# Patient Record
Sex: FEMALE | Race: WHITE | NOT HISPANIC OR LATINO | Employment: FULL TIME | URBAN - METROPOLITAN AREA
[De-identification: names, ages, dates, MRNs, and addresses within clinical notes are randomized per-mention and may not be internally consistent; named-entity substitution may affect disease eponyms.]

---

## 2022-05-18 ENCOUNTER — OFFICE VISIT (OUTPATIENT)
Dept: PLASTIC SURGERY | Facility: CLINIC | Age: 52
End: 2022-05-18
Payer: COMMERCIAL

## 2022-05-18 VITALS — HEART RATE: 86 BPM | DIASTOLIC BLOOD PRESSURE: 83 MMHG | SYSTOLIC BLOOD PRESSURE: 148 MMHG

## 2022-05-18 DIAGNOSIS — Z98.890 S/P BREAST RECONSTRUCTION: Primary | ICD-10-CM

## 2022-05-18 DIAGNOSIS — T85.49XA DEFLATION OF BREAST IMPLANT, INITIAL ENCOUNTER: ICD-10-CM

## 2022-05-18 PROCEDURE — 99203 PR OFFICE/OUTPT VISIT, NEW, LEVL III, 30-44 MIN: ICD-10-PCS | Mod: S$GLB,,, | Performed by: SURGERY

## 2022-05-18 PROCEDURE — 3079F DIAST BP 80-89 MM HG: CPT | Mod: CPTII,S$GLB,, | Performed by: SURGERY

## 2022-05-18 PROCEDURE — 99999 PR PBB SHADOW E&M-NEW PATIENT-LVL II: ICD-10-PCS | Mod: PBBFAC,,, | Performed by: SURGERY

## 2022-05-18 PROCEDURE — 3077F SYST BP >= 140 MM HG: CPT | Mod: CPTII,S$GLB,, | Performed by: SURGERY

## 2022-05-18 PROCEDURE — 3079F PR MOST RECENT DIASTOLIC BLOOD PRESSURE 80-89 MM HG: ICD-10-PCS | Mod: CPTII,S$GLB,, | Performed by: SURGERY

## 2022-05-18 PROCEDURE — 3077F PR MOST RECENT SYSTOLIC BLOOD PRESSURE >= 140 MM HG: ICD-10-PCS | Mod: CPTII,S$GLB,, | Performed by: SURGERY

## 2022-05-18 PROCEDURE — 99999 PR PBB SHADOW E&M-NEW PATIENT-LVL II: CPT | Mod: PBBFAC,,, | Performed by: SURGERY

## 2022-05-18 PROCEDURE — 99203 OFFICE O/P NEW LOW 30 MIN: CPT | Mod: S$GLB,,, | Performed by: SURGERY

## 2022-05-18 NOTE — PROGRESS NOTES
Patient presents Plastic surgery Clinic after undergoing a bilateral mastectomy and reconstruction using tissue expanders followed by implants in 2018. Patient now presents to the Plastic surgery Clinic with a deflated left breast implant.  The  and patient basically stated happen very rapidly.  Physical examination reveals very good was reconstruction on the right side.  She could use a little bit of liposuction in the axillary region.  On the left side the implant was totally deflated.  We will go ahead and bring the patient to surgery for an implant exchange.  The patient does want to be more perky and I had a long discussion with the patient the only way to do this we make her implants larger. She has agreed to this but not much larger.

## 2022-05-25 ENCOUNTER — TELEPHONE (OUTPATIENT)
Dept: PLASTIC SURGERY | Facility: CLINIC | Age: 52
End: 2022-05-25
Payer: COMMERCIAL

## 2022-05-25 NOTE — TELEPHONE ENCOUNTER
[facility] [phone]     ----- Message from Kenia Hollis sent at 5/25/2022 10:13 AM CDT -----  Regarding: Schedule surgery  Contact: [phone]  Calling to speak with nurse to schedule surgery and initiate authorization for procedure. Please call and discuss.      
 Infant (Birth)

## 2022-05-27 ENCOUNTER — PATIENT MESSAGE (OUTPATIENT)
Dept: PLASTIC SURGERY | Facility: CLINIC | Age: 52
End: 2022-05-27
Payer: COMMERCIAL

## 2022-05-27 ENCOUNTER — TELEPHONE (OUTPATIENT)
Dept: PLASTIC SURGERY | Facility: CLINIC | Age: 52
End: 2022-05-27
Payer: COMMERCIAL

## 2022-05-27 DIAGNOSIS — Z85.3 HISTORY OF BREAST CANCER IN FEMALE: Primary | ICD-10-CM

## 2022-05-27 DIAGNOSIS — Z01.818 PRE-OP TESTING: Primary | ICD-10-CM

## 2022-05-27 NOTE — TELEPHONE ENCOUNTER
Contacted pt with arrival time for surgery on Monday, May 30.  Pt advised to arrive on the 2nd floor, [facility] for 9:30a.  Pt also reminded not to eat or drink after midnight.  Pt's questions addressed.  Pt verbalized understanding.

## 2022-05-29 ENCOUNTER — ANESTHESIA EVENT (OUTPATIENT)
Dept: SURGERY | Facility: HOSPITAL | Age: 52
End: 2022-05-29
Payer: COMMERCIAL

## 2022-05-29 RX ORDER — MIDAZOLAM HYDROCHLORIDE 1 MG/ML
.5-4 INJECTION INTRAMUSCULAR; INTRAVENOUS
Status: CANCELLED | OUTPATIENT
Start: 2022-05-29

## 2022-05-29 RX ORDER — FENTANYL CITRATE 50 UG/ML
25-200 INJECTION, SOLUTION INTRAMUSCULAR; INTRAVENOUS
Status: CANCELLED | OUTPATIENT
Start: 2022-05-29

## 2022-05-29 NOTE — PRE-PROCEDURE INSTRUCTIONS
Preop instructions: NPO solids/ milk products after midnight and clears up to6a(clear liquids are: water, apple juice, Gatorade), shower instructions, directions, leave all valuables at home, wear comfortable clothes (something that buttons up the front is best), explained. Patient stated an understanding.     Patient denies any side effects or issues with anesthesia or sedation.

## 2022-05-30 ENCOUNTER — ANESTHESIA (OUTPATIENT)
Dept: SURGERY | Facility: HOSPITAL | Age: 52
End: 2022-05-30
Payer: COMMERCIAL

## 2022-05-30 ENCOUNTER — HOSPITAL ENCOUNTER (OUTPATIENT)
Facility: HOSPITAL | Age: 52
Discharge: HOME | End: 2022-05-30
Attending: SURGERY | Admitting: SURGERY
Payer: COMMERCIAL

## 2022-05-30 VITALS
OXYGEN SATURATION: 97 % | RESPIRATION RATE: 20 BRPM | BODY MASS INDEX: 28.32 KG/M2 | HEIGHT: 65 IN | HEART RATE: 52 BPM | WEIGHT: 170 LBS | DIASTOLIC BLOOD PRESSURE: 63 MMHG | TEMPERATURE: 98 F | SYSTOLIC BLOOD PRESSURE: 107 MMHG

## 2022-05-30 DIAGNOSIS — Z98.890 HISTORY OF BREAST RECONSTRUCTION: Primary | ICD-10-CM

## 2022-05-30 DIAGNOSIS — Z98.890 H/O BREAST RECONSTRUCTION: ICD-10-CM

## 2022-05-30 LAB
CTP QC/QA: YES
SARS-COV-2 AG RESP QL IA.RAPID: NEGATIVE

## 2022-05-30 PROCEDURE — 19342 INSJ/RPLCMT BRST IMPLT SEP D: CPT | Mod: 50,,, | Performed by: SURGERY

## 2022-05-30 PROCEDURE — D9220A PRA ANESTHESIA: Mod: ,,, | Performed by: ANESTHESIOLOGY

## 2022-05-30 PROCEDURE — 71000015 HC POSTOP RECOV 1ST HR: Performed by: SURGERY

## 2022-05-30 PROCEDURE — 63600175 PHARM REV CODE 636 W HCPCS: Performed by: ANESTHESIOLOGY

## 2022-05-30 PROCEDURE — 19342 PR DELAY BREAST PROS AFTER BREAST SURG: ICD-10-PCS | Mod: 50,,, | Performed by: SURGERY

## 2022-05-30 PROCEDURE — 25000003 PHARM REV CODE 250: Performed by: ANESTHESIOLOGY

## 2022-05-30 PROCEDURE — 71000044 HC DOSC ROUTINE RECOVERY FIRST HOUR: Performed by: SURGERY

## 2022-05-30 PROCEDURE — 19330 RMVL RUPTURED BREAST IMPLANT: CPT | Mod: 51,LT,, | Performed by: SURGERY

## 2022-05-30 PROCEDURE — 88300 SURGICAL PATH GROSS: CPT | Mod: 59 | Performed by: PATHOLOGY

## 2022-05-30 PROCEDURE — 88300 PR  SURG PATH,GROSS,LEVEL I: ICD-10-PCS | Mod: 26,,, | Performed by: PATHOLOGY

## 2022-05-30 PROCEDURE — 37000008 HC ANESTHESIA 1ST 15 MINUTES: Performed by: SURGERY

## 2022-05-30 PROCEDURE — 88300 SURGICAL PATH GROSS: CPT | Mod: 26,,, | Performed by: PATHOLOGY

## 2022-05-30 PROCEDURE — C1789 PROSTHESIS, BREAST, IMP: HCPCS | Performed by: SURGERY

## 2022-05-30 PROCEDURE — 25000003 PHARM REV CODE 250: Performed by: STUDENT IN AN ORGANIZED HEALTH CARE EDUCATION/TRAINING PROGRAM

## 2022-05-30 PROCEDURE — D9220A PRA ANESTHESIA: ICD-10-PCS | Mod: ,,, | Performed by: ANESTHESIOLOGY

## 2022-05-30 PROCEDURE — 25000003 PHARM REV CODE 250: Performed by: SURGERY

## 2022-05-30 PROCEDURE — 36000706: Performed by: SURGERY

## 2022-05-30 PROCEDURE — 37000009 HC ANESTHESIA EA ADD 15 MINS: Performed by: SURGERY

## 2022-05-30 PROCEDURE — 19330 PR REMOVAL OF IMPLANT MATERIAL: ICD-10-PCS | Mod: 51,LT,, | Performed by: SURGERY

## 2022-05-30 PROCEDURE — 36000707: Performed by: SURGERY

## 2022-05-30 PROCEDURE — 63600175 PHARM REV CODE 636 W HCPCS: Performed by: SURGERY

## 2022-05-30 PROCEDURE — 71000016 HC POSTOP RECOV ADDL HR: Performed by: SURGERY

## 2022-05-30 DEVICE — IMPLANTABLE DEVICE: Type: IMPLANTABLE DEVICE | Site: BREAST | Status: FUNCTIONAL

## 2022-05-30 RX ORDER — HALOPERIDOL 5 MG/ML
INJECTION INTRAMUSCULAR
Status: DISCONTINUED | OUTPATIENT
Start: 2022-05-30 | End: 2022-05-30

## 2022-05-30 RX ORDER — SUCCINYLCHOLINE CHLORIDE 20 MG/ML
INJECTION INTRAMUSCULAR; INTRAVENOUS
Status: DISCONTINUED | OUTPATIENT
Start: 2022-05-30 | End: 2022-05-30

## 2022-05-30 RX ORDER — KETAMINE HCL IN 0.9 % NACL 50 MG/5 ML
SYRINGE (ML) INTRAVENOUS
Status: DISCONTINUED | OUTPATIENT
Start: 2022-05-30 | End: 2022-05-30

## 2022-05-30 RX ORDER — ONDANSETRON 2 MG/ML
INJECTION INTRAMUSCULAR; INTRAVENOUS
Status: DISCONTINUED | OUTPATIENT
Start: 2022-05-30 | End: 2022-05-30

## 2022-05-30 RX ORDER — OXYCODONE AND ACETAMINOPHEN 5; 325 MG/1; MG/1
1 TABLET ORAL EVERY 6 HOURS PRN
Qty: 12 TABLET | Refills: 0 | Status: SHIPPED | OUTPATIENT
Start: 2022-05-30 | End: 2022-06-02

## 2022-05-30 RX ORDER — CIPROFLOXACIN 2 MG/ML
INJECTION, SOLUTION INTRAVENOUS
Status: COMPLETED | OUTPATIENT
Start: 2022-05-30 | End: 2022-05-30

## 2022-05-30 RX ORDER — CLINDAMYCIN HYDROCHLORIDE 300 MG/1
300 CAPSULE ORAL EVERY 6 HOURS
Qty: 28 CAPSULE | Refills: 0 | Status: SHIPPED | OUTPATIENT
Start: 2022-05-30 | End: 2022-06-06

## 2022-05-30 RX ORDER — ACETAMINOPHEN 10 MG/ML
INJECTION, SOLUTION INTRAVENOUS
Status: DISCONTINUED | OUTPATIENT
Start: 2022-05-30 | End: 2022-05-30

## 2022-05-30 RX ORDER — SODIUM CHLORIDE 0.9 % (FLUSH) 0.9 %
3 SYRINGE (ML) INJECTION
Status: DISCONTINUED | OUTPATIENT
Start: 2022-05-30 | End: 2022-05-30 | Stop reason: HOSPADM

## 2022-05-30 RX ORDER — OXYCODONE AND ACETAMINOPHEN 5; 325 MG/1; MG/1
1 TABLET ORAL ONCE
Status: COMPLETED | OUTPATIENT
Start: 2022-05-30 | End: 2022-05-30

## 2022-05-30 RX ORDER — DEXMEDETOMIDINE HYDROCHLORIDE 100 UG/ML
INJECTION, SOLUTION INTRAVENOUS
Status: DISCONTINUED | OUTPATIENT
Start: 2022-05-30 | End: 2022-05-30

## 2022-05-30 RX ORDER — ROCURONIUM BROMIDE 10 MG/ML
INJECTION, SOLUTION INTRAVENOUS
Status: DISCONTINUED | OUTPATIENT
Start: 2022-05-30 | End: 2022-05-30

## 2022-05-30 RX ORDER — MIDAZOLAM HYDROCHLORIDE 1 MG/ML
INJECTION, SOLUTION INTRAMUSCULAR; INTRAVENOUS
Status: DISCONTINUED | OUTPATIENT
Start: 2022-05-30 | End: 2022-05-30

## 2022-05-30 RX ORDER — DEXAMETHASONE SODIUM PHOSPHATE 4 MG/ML
INJECTION, SOLUTION INTRA-ARTICULAR; INTRALESIONAL; INTRAMUSCULAR; INTRAVENOUS; SOFT TISSUE
Status: DISCONTINUED | OUTPATIENT
Start: 2022-05-30 | End: 2022-05-30

## 2022-05-30 RX ORDER — CLINDAMYCIN PHOSPHATE 600 MG/50ML
600 INJECTION, SOLUTION INTRAVENOUS ONCE
Status: DISCONTINUED | OUTPATIENT
Start: 2022-05-30 | End: 2022-05-30 | Stop reason: HOSPADM

## 2022-05-30 RX ORDER — NEOSTIGMINE METHYLSULFATE 0.5 MG/ML
INJECTION, SOLUTION INTRAVENOUS
Status: DISCONTINUED | OUTPATIENT
Start: 2022-05-30 | End: 2022-05-30

## 2022-05-30 RX ORDER — PROPOFOL 10 MG/ML
VIAL (ML) INTRAVENOUS
Status: DISCONTINUED | OUTPATIENT
Start: 2022-05-30 | End: 2022-05-30

## 2022-05-30 RX ORDER — CEFAZOLIN SODIUM/WATER 2 G/20 ML
SYRINGE (ML) INTRAVENOUS
Status: DISCONTINUED | OUTPATIENT
Start: 2022-05-30 | End: 2022-05-30

## 2022-05-30 RX ADMIN — GLYCOPYRROLATE 0.4 MG: 0.2 INJECTION INTRAMUSCULAR; INTRAVENOUS at 01:05

## 2022-05-30 RX ADMIN — OXYCODONE HYDROCHLORIDE AND ACETAMINOPHEN 1 TABLET: 5; 325 TABLET ORAL at 02:05

## 2022-05-30 RX ADMIN — DEXMEDETOMIDINE HYDROCHLORIDE 0.25 MCG/KG/HR: 100 INJECTION, SOLUTION INTRAVENOUS at 12:05

## 2022-05-30 RX ADMIN — DEXAMETHASONE SODIUM PHOSPHATE 8 MG: 4 INJECTION INTRA-ARTICULAR; INTRALESIONAL; INTRAMUSCULAR; INTRAVENOUS; SOFT TISSUE at 12:05

## 2022-05-30 RX ADMIN — ROCURONIUM BROMIDE 2 MG: 10 INJECTION INTRAVENOUS at 12:05

## 2022-05-30 RX ADMIN — ACETAMINOPHEN 1000 MG: 10 INJECTION INTRAVENOUS at 12:05

## 2022-05-30 RX ADMIN — Medication 20 MG: at 12:05

## 2022-05-30 RX ADMIN — Medication 2 G: at 12:05

## 2022-05-30 RX ADMIN — PROPOFOL 180 MG: 10 INJECTION, EMULSION INTRAVENOUS at 12:05

## 2022-05-30 RX ADMIN — SODIUM CHLORIDE, SODIUM GLUCONATE, SODIUM ACETATE, POTASSIUM CHLORIDE, MAGNESIUM CHLORIDE, SODIUM PHOSPHATE, DIBASIC, AND POTASSIUM PHOSPHATE: .53; .5; .37; .037; .03; .012; .00082 INJECTION, SOLUTION INTRAVENOUS at 12:05

## 2022-05-30 RX ADMIN — DEXMEDETOMIDINE HYDROCHLORIDE 77 MCG: 100 INJECTION, SOLUTION INTRAVENOUS at 12:05

## 2022-05-30 RX ADMIN — NEOSTIGMINE METHYLSULFATE 3 MG: 0.5 INJECTION, SOLUTION INTRAVENOUS at 01:05

## 2022-05-30 RX ADMIN — ROCURONIUM BROMIDE 38 MG: 10 INJECTION INTRAVENOUS at 12:05

## 2022-05-30 RX ADMIN — MIDAZOLAM 2 MG: 1 INJECTION INTRAMUSCULAR; INTRAVENOUS at 12:05

## 2022-05-30 RX ADMIN — ONDANSETRON 4 MG: 2 INJECTION INTRAMUSCULAR; INTRAVENOUS at 01:05

## 2022-05-30 RX ADMIN — HALOPERIDOL LACTATE 1 MG: 5 INJECTION, SOLUTION INTRAMUSCULAR at 12:05

## 2022-05-30 RX ADMIN — SUCCINYLCHOLINE CHLORIDE 160 MG: 20 INJECTION, SOLUTION INTRAMUSCULAR; INTRAVENOUS; PARENTERAL at 12:05

## 2022-05-30 NOTE — ANESTHESIA POSTPROCEDURE EVALUATION
Anesthesia Post Evaluation    Patient: Tomasa Lay    Procedure(s) Performed: Procedure(s) (LRB):  REMOVAL, IMPLANT (Bilateral)  REPLACEMENT, IMPLANT, BREAST (Bilateral)    Final Anesthesia Type: general      Patient location during evaluation: PACU  Patient participation: Yes- Able to Participate  Level of consciousness: awake and alert  Post-procedure vital signs: reviewed and stable  Pain management: adequate  Airway patency: patent    PONV status at discharge: No PONV  Anesthetic complications: no      Cardiovascular status: blood pressure returned to baseline  Respiratory status: unassisted  Hydration status: euvolemic  Follow-up not needed.          Vitals Value Taken Time   /64 05/30/22 1502   Temp 36.4 °C (97.6 °F) 05/30/22 1345   Pulse 52 05/30/22 1514   Resp 12 05/30/22 1514   SpO2 97 % 05/30/22 1514   Vitals shown include unvalidated device data.      No case tracking events are documented in the log.      Pain/Tiffani Score: Pain Rating Prior to Med Admin: 8 (5/30/2022  2:22 PM)  Tiffani Score: 9 (5/30/2022  1:45 PM)

## 2022-05-30 NOTE — TRANSFER OF CARE
"Anesthesia Transfer of Care Note    Patient: Tomasa Lay    Procedure(s) Performed: Procedure(s) (LRB):  REMOVAL, IMPLANT (Bilateral)  REPLACEMENT, IMPLANT, BREAST (Bilateral)    Patient location: PACU    Anesthesia Type: general    Transport from OR: Transported from OR on 6-10 L/min O2 by face mask with adequate spontaneous ventilation    Post pain: adequate analgesia    Post assessment: no apparent anesthetic complications    Post vital signs: stable    Level of consciousness: awake    Nausea/Vomiting: no nausea/vomiting    Complications: none    Transfer of care protocol was followed      Last vitals:   Visit Vitals  BP (!) 148/69 (BP Location: Left arm, Patient Position: Lying)   Pulse 75   Temp 36.6 °C (97.9 °F) (Oral)   Resp 16   Ht 5' 5" (1.651 m)   Wt 77.1 kg (170 lb)   SpO2 100%   Breastfeeding No   BMI 28.29 kg/m²     "

## 2022-05-30 NOTE — PATIENT INSTRUCTIONS
Take pain medication and oral antibiotics as prescribed. Only surgical bra for 6 weeks. Keep surgical sites clean and dry for 48 hours and then can shower normally. Return to clinic in 1 week

## 2022-05-30 NOTE — ANESTHESIA PROCEDURE NOTES
Intubation    Date/Time: 5/30/2022 12:29 PM  Performed by: Wesley Mcdaniel MD  Authorized by: Wesley Mcdaniel MD     Intubation:     Induction:  Intravenous    Intubated:  Postinduction    Mask Ventilation:  N/a    Attempts:  1    Attempted By:  Staff anesthesiologist    Method of Intubation:  Video laryngoscopy    Blade:  Lanza 2    Laryngeal View Grade: Grade I - full view of cords      Difficult Airway Encountered?: No      Complications:  None    Airway Device:  Oral endotracheal tube    Airway Device Size:  7.5    Style/Cuff Inflation:  Cuffed    Inflation Amount (mL):  5    Tube secured:  20    Secured at:  The lips    Placement Verified By:  Capnometry    Complicating Factors:  None    Findings Post-Intubation:  BS equal bilateral

## 2022-05-30 NOTE — DISCHARGE SUMMARY
[facility] - Surgery (2nd Fl)  Discharge Note  Short Stay    Procedure(s) (LRB):  REMOVAL, IMPLANT (Bilateral)  REPLACEMENT, IMPLANT, BREAST (Bilateral)    OUTCOME: Patient tolerated treatment/procedure well without complication and is now ready for discharge.    DISPOSITION: Home or Self Care    FINAL DIAGNOSIS:  History of breast cancer    FOLLOWUP: In clinic    DISCHARGE INSTRUCTIONS:  Take pain medication and oral antibiotics as prescribed. Only surgical bra for 6 weeks. Keep surgical sites clean and dry for 48 hours and then can shower normally. Return to clinic in 1 week     TIME SPENT ON DISCHARGE: 10 minutes

## 2022-05-30 NOTE — OP NOTE
Date of surgery 05/30/2022  Preoperative diagnosis history of breast cancer  2. Deflated left breast implant  Postoperative diagnosis is the same  Procedure performed bilateral implant exchange  Surgeon Vahe  Anesthesia general  Complications none  Blood loss minimal  Drains none    Patient was placed in supine position after adequate general anesthesia was prepped and draped in a normal sterile fashion.  The previous mastectomy incisions laterally were opened.  The capsule was opened at a different level.  The deflated left breast implant was removed and the full right saline implant was removed.  The implants were 750 cc.    The chest wall was then re-prepped redraped gloves were changed chest wall was prepped with ChloraPrep to 800 cc smooth silicone implants were placed in the pockets.  Capsules were closed using interrupted 2-0 Vicryl the skin on both sides was closed using interrupted 3-0 Monocryl followed by running 4-0 Monocryl subcuticular suture.  There were no complications end dictation

## 2022-05-30 NOTE — ANESTHESIA PREPROCEDURE EVALUATION
05/30/2022  Tomasa Lay is a 52 y.o., female.      Pre-op Assessment    I have reviewed the Patient Summary Reports.       I have reviewed the Medications.     Review of Systems  Anesthesia Hx:   Denies Personal Hx of Anesthesia complications.       Physical Exam  General: Well nourished    Airway:  Mallampati: III / II  Mouth Opening: Normal  TM Distance: Normal  Tongue: Normal  Neck ROM: Normal ROM    Chest/Lungs:  Normal Respiratory Rate    Heart:  Rate: Normal        Anesthesia Plan  Type of Anesthesia, risks & benefits discussed:    Anesthesia Type: Gen ETT  Intra-op Monitoring Plan: Standard ASA Monitors  Post Op Pain Control Plan: multimodal analgesia  Induction:  IV  Airway Plan: Video  Informed Consent: Informed consent signed with the Patient and all parties understand the risks and agree with anesthesia plan.  All questions answered.   ASA Score: 2  Day of Surgery Review of History & Physical: H&P Update referred to the surgeon/provider.  Anesthesia Plan Notes: NPO confirmed.   No history of anesthesia problems.     Ready For Surgery From Anesthesia Perspective.     .

## 2022-05-30 NOTE — BRIEF OP NOTE
[facility] ([facility])  Brief Operative Note    Surgery Date: 5/30/2022     Surgeon(s) and Role:     * Bijan Cotter MD - Primary    Assisting Surgeon:   Valentín Li MD - Fellow     Pre-op Diagnosis:  History of breast cancer in female [Z85.3]    Post-op Diagnosis:  Post-Op Diagnosis Codes:     * History of breast cancer in female [Z85.3]    Procedure(s) (LRB):  REMOVAL, IMPLANT (Bilateral)  REPLACEMENT, IMPLANT, BREAST (Bilateral)    Anesthesia: General/MAC    Operative Findings: See op note    Estimated Blood Loss: minimal    Specimens:   Specimen (24h ago, onward)            None            Discharge Note    OUTCOME: Patient tolerated treatment/procedure well without complication and is now ready for discharge.    DISPOSITION: Home or Self Care    FINAL DIAGNOSIS:  History of breast cancer    FOLLOWUP: In clinic

## 2022-06-08 ENCOUNTER — OFFICE VISIT (OUTPATIENT)
Dept: PLASTIC SURGERY | Facility: CLINIC | Age: 52
End: 2022-06-08
Payer: COMMERCIAL

## 2022-06-08 ENCOUNTER — PATIENT MESSAGE (OUTPATIENT)
Dept: PLASTIC SURGERY | Facility: CLINIC | Age: 52
End: 2022-06-08

## 2022-06-08 VITALS
HEIGHT: 65 IN | SYSTOLIC BLOOD PRESSURE: 164 MMHG | DIASTOLIC BLOOD PRESSURE: 95 MMHG | WEIGHT: 170 LBS | BODY MASS INDEX: 28.32 KG/M2 | HEART RATE: 70 BPM

## 2022-06-08 DIAGNOSIS — Z85.3 HISTORY OF BREAST CANCER: Primary | ICD-10-CM

## 2022-06-08 DIAGNOSIS — Z09 SURGERY FOLLOW-UP EXAMINATION: Primary | ICD-10-CM

## 2022-06-08 PROCEDURE — 1160F RVW MEDS BY RX/DR IN RCRD: CPT | Mod: CPTII,S$GLB,, | Performed by: SURGERY

## 2022-06-08 PROCEDURE — 3077F PR MOST RECENT SYSTOLIC BLOOD PRESSURE >= 140 MM HG: ICD-10-PCS | Mod: CPTII,S$GLB,, | Performed by: SURGERY

## 2022-06-08 PROCEDURE — 1159F MED LIST DOCD IN RCRD: CPT | Mod: CPTII,S$GLB,, | Performed by: SURGERY

## 2022-06-08 PROCEDURE — 3080F PR MOST RECENT DIASTOLIC BLOOD PRESSURE >= 90 MM HG: ICD-10-PCS | Mod: CPTII,S$GLB,, | Performed by: SURGERY

## 2022-06-08 PROCEDURE — 3008F PR BODY MASS INDEX (BMI) DOCUMENTED: ICD-10-PCS | Mod: CPTII,S$GLB,, | Performed by: SURGERY

## 2022-06-08 PROCEDURE — 3008F BODY MASS INDEX DOCD: CPT | Mod: CPTII,S$GLB,, | Performed by: SURGERY

## 2022-06-08 PROCEDURE — 1160F PR REVIEW ALL MEDS BY PRESCRIBER/CLIN PHARMACIST DOCUMENTED: ICD-10-PCS | Mod: CPTII,S$GLB,, | Performed by: SURGERY

## 2022-06-08 PROCEDURE — 99999 PR PBB SHADOW E&M-EST. PATIENT-LVL III: CPT | Mod: PBBFAC,,, | Performed by: SURGERY

## 2022-06-08 PROCEDURE — 99024 PR POST-OP FOLLOW-UP VISIT: ICD-10-PCS | Mod: S$GLB,,, | Performed by: SURGERY

## 2022-06-08 PROCEDURE — 99999 PR PBB SHADOW E&M-EST. PATIENT-LVL III: ICD-10-PCS | Mod: PBBFAC,,, | Performed by: SURGERY

## 2022-06-08 PROCEDURE — 3080F DIAST BP >= 90 MM HG: CPT | Mod: CPTII,S$GLB,, | Performed by: SURGERY

## 2022-06-08 PROCEDURE — 3077F SYST BP >= 140 MM HG: CPT | Mod: CPTII,S$GLB,, | Performed by: SURGERY

## 2022-06-08 PROCEDURE — 1159F PR MEDICATION LIST DOCUMENTED IN MEDICAL RECORD: ICD-10-PCS | Mod: CPTII,S$GLB,, | Performed by: SURGERY

## 2022-06-08 PROCEDURE — 99024 POSTOP FOLLOW-UP VISIT: CPT | Mod: S$GLB,,, | Performed by: SURGERY

## 2022-06-08 RX ORDER — FLUCONAZOLE 150 MG/1
150 TABLET ORAL ONCE
COMMUNITY
Start: 2022-03-10

## 2022-06-08 RX ORDER — AMOXICILLIN AND CLAVULANATE POTASSIUM 875; 125 MG/1; MG/1
TABLET, FILM COATED ORAL
COMMUNITY
Start: 2022-03-10 | End: 2022-10-12

## 2022-06-08 RX ORDER — PSEUDOEPHEDRINE HCL 30 MG/1
60 TABLET, FILM COATED ORAL DAILY PRN
COMMUNITY
Start: 2022-03-10

## 2022-06-08 RX ORDER — FLUTICASONE PROPIONATE 50 MCG
2 SPRAY, SUSPENSION (ML) NASAL DAILY
COMMUNITY
Start: 2022-03-10

## 2022-06-08 NOTE — PROGRESS NOTES
"History & Physical  Plastic Surgery Clinic    SUBJECTIVE:     Chief complaint: 1 week post op     History of Present Illness:  Patient is a 52 y.o. female 1 week post op from bilateral implant exchange. She denies any fever or chills. She reports hearing a "sloshing" sound earlier in the week.     Past Medical History:  No past medical history on file.    Past Surgical History:  Past Surgical History:   Procedure Laterality Date    REMOVAL OF IMPLANT Bilateral 2022    Procedure: REMOVAL, IMPLANT;  Surgeon: Bijan Cotter MD;  Location: [address];  Service: Plastics;  Laterality: Bilateral;    REPLACEMENT OF IMPLANT OF BREAST Bilateral 2022    Procedure: REPLACEMENT, IMPLANT, BREAST;  Surgeon: Bijan Cotter MD;  Location: [address];  Service: Plastics;  Laterality: Bilateral;       Family History:  No family history on file.    Social History:  Social History     Socioeconomic History    Marital status:    Tobacco Use    Smoking status: Never Smoker       Medications:  Current Outpatient Medications   Medication Sig Dispense Refill    amoxicillin-clavulanate 875-125mg (AUGMENTIN) 875-125 mg per tablet SMARTSI Tablet(s) By Mouth Every 12 Hours      fluconazole (DIFLUCAN) 150 MG Tab Take 150 mg by mouth once.      fluticasone propionate (FLONASE) 50 mcg/actuation nasal spray 2 sprays by Each Nostril route once daily.      SUDOGEST 30 mg tablet Take 60 mg by mouth daily as needed.       No current facility-administered medications for this visit.       Allergies:  Review of patient's allergies indicates:  No Known Allergies    Review of Systems:  Negative except for HPI.      OBJECTIVE:     BP (!) 164/95 (BP Location: Left arm, Patient Position: Sitting)   Pulse 70   Ht 5' 5" (1.651 m)   Wt 77.1 kg (170 lb)   BMI 28.29 kg/m²     Physical Exam:  Constitutional: Oriented to person, place, and time. Appears well-developed and well-nourished.   Breast: left " breast appears slightly larger then right side, glue and tape in place bilaterally no evidence of hematoma or breakdown/ local infection. Left inferior and lateral breast appears to have some fluid         ASSESSMENT/PLAN:     52 y.o. F 1 week post op from implant exchange, seroma of left side   - Aspirate at bedside   - Follow up 1 week

## 2022-06-13 LAB
FINAL PATHOLOGIC DIAGNOSIS: NORMAL
GROSS: NORMAL
Lab: NORMAL

## 2022-06-14 DIAGNOSIS — Z85.3 HISTORY OF BREAST CANCER: Primary | ICD-10-CM

## 2022-06-15 ENCOUNTER — HOSPITAL ENCOUNTER (OUTPATIENT)
Dept: RADIOLOGY | Facility: HOSPITAL | Age: 52
Discharge: HOME | End: 2022-06-15
Attending: SURGERY
Payer: COMMERCIAL

## 2022-06-15 DIAGNOSIS — Z85.3 HISTORY OF BREAST CANCER: ICD-10-CM

## 2022-06-15 PROCEDURE — 76641 ULTRASOUND BREAST COMPLETE: CPT | Mod: 26,LT,, | Performed by: RADIOLOGY

## 2022-06-15 PROCEDURE — 76641 ULTRASOUND BREAST COMPLETE: CPT | Mod: TC,LT

## 2022-06-15 PROCEDURE — 76641 US BREAST LEFT COMPLETE: ICD-10-PCS | Mod: 26,LT,, | Performed by: RADIOLOGY

## 2022-06-29 ENCOUNTER — OFFICE VISIT (OUTPATIENT)
Dept: PLASTIC SURGERY | Facility: CLINIC | Age: 52
End: 2022-06-29
Payer: COMMERCIAL

## 2022-06-29 VITALS — HEART RATE: 75 BPM | SYSTOLIC BLOOD PRESSURE: 134 MMHG | DIASTOLIC BLOOD PRESSURE: 74 MMHG

## 2022-06-29 DIAGNOSIS — Z09 SURGERY FOLLOW-UP EXAMINATION: Primary | ICD-10-CM

## 2022-06-29 PROCEDURE — 3078F PR MOST RECENT DIASTOLIC BLOOD PRESSURE < 80 MM HG: ICD-10-PCS | Mod: CPTII,S$GLB,, | Performed by: SURGERY

## 2022-06-29 PROCEDURE — 3075F PR MOST RECENT SYSTOLIC BLOOD PRESS GE 130-139MM HG: ICD-10-PCS | Mod: CPTII,S$GLB,, | Performed by: SURGERY

## 2022-06-29 PROCEDURE — 99999 PR PBB SHADOW E&M-EST. PATIENT-LVL III: CPT | Mod: PBBFAC,,, | Performed by: SURGERY

## 2022-06-29 PROCEDURE — 99999 PR PBB SHADOW E&M-EST. PATIENT-LVL III: ICD-10-PCS | Mod: PBBFAC,,, | Performed by: SURGERY

## 2022-06-29 PROCEDURE — 1160F PR REVIEW ALL MEDS BY PRESCRIBER/CLIN PHARMACIST DOCUMENTED: ICD-10-PCS | Mod: CPTII,S$GLB,, | Performed by: SURGERY

## 2022-06-29 PROCEDURE — 99024 PR POST-OP FOLLOW-UP VISIT: ICD-10-PCS | Mod: S$GLB,,, | Performed by: SURGERY

## 2022-06-29 PROCEDURE — 1159F MED LIST DOCD IN RCRD: CPT | Mod: CPTII,S$GLB,, | Performed by: SURGERY

## 2022-06-29 PROCEDURE — 1159F PR MEDICATION LIST DOCUMENTED IN MEDICAL RECORD: ICD-10-PCS | Mod: CPTII,S$GLB,, | Performed by: SURGERY

## 2022-06-29 PROCEDURE — 3075F SYST BP GE 130 - 139MM HG: CPT | Mod: CPTII,S$GLB,, | Performed by: SURGERY

## 2022-06-29 PROCEDURE — 1160F RVW MEDS BY RX/DR IN RCRD: CPT | Mod: CPTII,S$GLB,, | Performed by: SURGERY

## 2022-06-29 PROCEDURE — 3078F DIAST BP <80 MM HG: CPT | Mod: CPTII,S$GLB,, | Performed by: SURGERY

## 2022-06-29 PROCEDURE — 99024 POSTOP FOLLOW-UP VISIT: CPT | Mod: S$GLB,,, | Performed by: SURGERY

## 2022-06-29 NOTE — PROGRESS NOTES
History & Physical  Plastic Surgery Clinic    SUBJECTIVE:     Chief complaint: 1 month follow up     History of Present Illness:  Patient is a 52 y.o. F 1 month follow up from TE exchange for permanent implant. She has been doing well no acute issues, no fever or chills. She reports the left breast appears bigger then the right breast. She had imaging to rule out fluid collection which was negative     Past Medical History:  No past medical history on file.    Past Surgical History:  Past Surgical History:   Procedure Laterality Date    REMOVAL OF IMPLANT Bilateral 2022    Procedure: REMOVAL, IMPLANT;  Surgeon: Bijan Cotter MD;  Location: [address];  Service: Plastics;  Laterality: Bilateral;    REPLACEMENT OF IMPLANT OF BREAST Bilateral 2022    Procedure: REPLACEMENT, IMPLANT, BREAST;  Surgeon: Bijan Cotter MD;  Location: [address];  Service: Plastics;  Laterality: Bilateral;       Family History:  No family history on file.    Social History:  Social History     Socioeconomic History    Marital status:    Tobacco Use    Smoking status: Never Smoker    Smokeless tobacco: Never Used       Medications:  Current Outpatient Medications   Medication Sig Dispense Refill    amoxicillin-clavulanate 875-125mg (AUGMENTIN) 875-125 mg per tablet SMARTSI Tablet(s) By Mouth Every 12 Hours      fluconazole (DIFLUCAN) 150 MG Tab Take 150 mg by mouth once.      fluticasone propionate (FLONASE) 50 mcg/actuation nasal spray 2 sprays by Each Nostril route once daily.      SUDOGEST 30 mg tablet Take 60 mg by mouth daily as needed.       No current facility-administered medications for this visit.       Allergies:  Review of patient's allergies indicates:  No Known Allergies    Review of Systems:  Negative except for HPI.      OBJECTIVE:     /74   Pulse 75     Physical Exam:  Constitutional: Oriented to person, place, and time. Appears well-developed and  well-nourished.   Breasts: b/l mastectomy scars healing well, left breast slightly larger then right no obvious fluid collection. No signs of infection or breakdown         ASSESSMENT/PLAN:     52 y.o. F 1 month post op from TE exchange for permanent implant doing well, slight size discrepancy between the breasts but improved  - Return to clinic 3 months to assess volume difference and possible need for fat grafting to right breast and or lipo from left lateral breast

## 2022-07-13 ENCOUNTER — PATIENT MESSAGE (OUTPATIENT)
Dept: PLASTIC SURGERY | Facility: CLINIC | Age: 52
End: 2022-07-13
Payer: COMMERCIAL

## 2022-08-12 ENCOUNTER — PATIENT MESSAGE (OUTPATIENT)
Dept: PLASTIC SURGERY | Facility: CLINIC | Age: 52
End: 2022-08-12

## 2022-08-12 ENCOUNTER — OFFICE VISIT (OUTPATIENT)
Dept: PLASTIC SURGERY | Facility: CLINIC | Age: 52
End: 2022-08-12
Payer: COMMERCIAL

## 2022-08-12 VITALS
BODY MASS INDEX: 28.32 KG/M2 | HEIGHT: 65 IN | HEART RATE: 73 BPM | SYSTOLIC BLOOD PRESSURE: 121 MMHG | DIASTOLIC BLOOD PRESSURE: 80 MMHG | WEIGHT: 170 LBS

## 2022-08-12 DIAGNOSIS — Z09 SURGERY FOLLOW-UP EXAMINATION: Primary | ICD-10-CM

## 2022-08-12 PROCEDURE — 3074F SYST BP LT 130 MM HG: CPT | Mod: CPTII,S$GLB,, | Performed by: SURGERY

## 2022-08-12 PROCEDURE — 3079F DIAST BP 80-89 MM HG: CPT | Mod: CPTII,S$GLB,, | Performed by: SURGERY

## 2022-08-12 PROCEDURE — 1160F PR REVIEW ALL MEDS BY PRESCRIBER/CLIN PHARMACIST DOCUMENTED: ICD-10-PCS | Mod: CPTII,S$GLB,, | Performed by: SURGERY

## 2022-08-12 PROCEDURE — 3079F PR MOST RECENT DIASTOLIC BLOOD PRESSURE 80-89 MM HG: ICD-10-PCS | Mod: CPTII,S$GLB,, | Performed by: SURGERY

## 2022-08-12 PROCEDURE — 99999 PR PBB SHADOW E&M-EST. PATIENT-LVL III: ICD-10-PCS | Mod: PBBFAC,,, | Performed by: SURGERY

## 2022-08-12 PROCEDURE — 99999 PR PBB SHADOW E&M-EST. PATIENT-LVL III: CPT | Mod: PBBFAC,,, | Performed by: SURGERY

## 2022-08-12 PROCEDURE — 1159F MED LIST DOCD IN RCRD: CPT | Mod: CPTII,S$GLB,, | Performed by: SURGERY

## 2022-08-12 PROCEDURE — 3008F PR BODY MASS INDEX (BMI) DOCUMENTED: ICD-10-PCS | Mod: CPTII,S$GLB,, | Performed by: SURGERY

## 2022-08-12 PROCEDURE — 3008F BODY MASS INDEX DOCD: CPT | Mod: CPTII,S$GLB,, | Performed by: SURGERY

## 2022-08-12 PROCEDURE — 99024 POSTOP FOLLOW-UP VISIT: CPT | Mod: S$GLB,,, | Performed by: SURGERY

## 2022-08-12 PROCEDURE — 1159F PR MEDICATION LIST DOCUMENTED IN MEDICAL RECORD: ICD-10-PCS | Mod: CPTII,S$GLB,, | Performed by: SURGERY

## 2022-08-12 PROCEDURE — 1160F RVW MEDS BY RX/DR IN RCRD: CPT | Mod: CPTII,S$GLB,, | Performed by: SURGERY

## 2022-08-12 PROCEDURE — 3074F PR MOST RECENT SYSTOLIC BLOOD PRESSURE < 130 MM HG: ICD-10-PCS | Mod: CPTII,S$GLB,, | Performed by: SURGERY

## 2022-08-12 PROCEDURE — 99024 PR POST-OP FOLLOW-UP VISIT: ICD-10-PCS | Mod: S$GLB,,, | Performed by: SURGERY

## 2022-08-12 NOTE — PROGRESS NOTES
Patient presents to Plastic surgery Clinic after having bilateral breast reconstruction.  She is a very nice result.  She has a little more fullness from natural tissue on the left lateral aspect than on the right.  She has a little lack of fullness superiorly on the right side.  Her reconstruction overall looks really good if she wants some happy to do some liposuction on the lateral aspect of the left breast in decent free fat transfer to the superior aspect of the of the right breast.

## 2022-10-04 ENCOUNTER — HOSPITAL ENCOUNTER (EMERGENCY)
Facility: HOSPITAL | Age: 52
Discharge: HOME | End: 2022-10-04
Payer: COMMERCIAL

## 2022-10-04 VITALS
SYSTOLIC BLOOD PRESSURE: 123 MMHG | WEIGHT: 175 LBS | HEART RATE: 113 BPM | BODY MASS INDEX: 29.16 KG/M2 | DIASTOLIC BLOOD PRESSURE: 86 MMHG | OXYGEN SATURATION: 100 % | TEMPERATURE: 98 F | HEIGHT: 65 IN | RESPIRATION RATE: 26 BRPM

## 2022-10-04 DIAGNOSIS — R52 PAIN: ICD-10-CM

## 2022-10-04 DIAGNOSIS — S62.639B OPEN FRACTURE OF TUFT OF DISTAL PHALANX OF FINGER: ICD-10-CM

## 2022-10-04 DIAGNOSIS — S61.313A LACERATION OF LEFT MIDDLE FINGER WITH DAMAGE TO NAIL, FOREIGN BODY PRESENCE UNSPECIFIED, INITIAL ENCOUNTER: Primary | ICD-10-CM

## 2022-10-04 PROCEDURE — 25000003 PHARM REV CODE 250: Performed by: NURSE PRACTITIONER

## 2022-10-04 PROCEDURE — 73120 X-RAY EXAM OF HAND: CPT | Mod: TC,LT

## 2022-10-04 PROCEDURE — 99283 EMERGENCY DEPT VISIT LOW MDM: CPT | Mod: 25

## 2022-10-04 PROCEDURE — 73120 X-RAY EXAM OF HAND: CPT | Mod: 26,LT,, | Performed by: RADIOLOGY

## 2022-10-04 PROCEDURE — 90471 IMMUNIZATION ADMIN: CPT | Performed by: NURSE PRACTITIONER

## 2022-10-04 PROCEDURE — 12002 RPR S/N/AX/GEN/TRNK2.6-7.5CM: CPT | Mod: F2

## 2022-10-04 PROCEDURE — 63600175 PHARM REV CODE 636 W HCPCS: Performed by: NURSE PRACTITIONER

## 2022-10-04 PROCEDURE — 90714 TD VACC NO PRESV 7 YRS+ IM: CPT | Performed by: NURSE PRACTITIONER

## 2022-10-04 PROCEDURE — 73120 XR HAND 2 VIEW LEFT: ICD-10-PCS | Mod: 26,LT,, | Performed by: RADIOLOGY

## 2022-10-04 RX ORDER — BUPIVACAINE HYDROCHLORIDE 5 MG/ML
5 INJECTION, SOLUTION EPIDURAL; INTRACAUDAL
Status: COMPLETED | OUTPATIENT
Start: 2022-10-04 | End: 2022-10-04

## 2022-10-04 RX ORDER — CEPHALEXIN 750 MG/1
750 CAPSULE ORAL EVERY 12 HOURS
Qty: 14 CAPSULE | Refills: 0 | Status: SHIPPED | OUTPATIENT
Start: 2022-10-04 | End: 2022-10-12 | Stop reason: ALTCHOICE

## 2022-10-04 RX ORDER — IBUPROFEN 800 MG/1
800 TABLET ORAL EVERY 6 HOURS PRN
Qty: 20 TABLET | Refills: 0 | Status: SHIPPED | OUTPATIENT
Start: 2022-10-04

## 2022-10-04 RX ORDER — IBUPROFEN 800 MG/1
800 TABLET ORAL EVERY 6 HOURS PRN
Qty: 20 TABLET | Refills: 0 | Status: SHIPPED | OUTPATIENT
Start: 2022-10-04 | End: 2022-10-04 | Stop reason: SDUPTHER

## 2022-10-04 RX ADMIN — TETANUS AND DIPHTHERIA TOXOIDS ADSORBED 0.5 ML: 2; 2 INJECTION INTRAMUSCULAR at 03:10

## 2022-10-04 RX ADMIN — BUPIVACAINE HYDROCHLORIDE 25 MG: 5 INJECTION, SOLUTION EPIDURAL; INTRACAUDAL; PERINEURAL at 02:10

## 2022-10-04 NOTE — DISCHARGE INSTRUCTIONS
Take antibiotics until finished.    Keep wound clean with soap and water daily, pat dry.    Follow-up in 10 days for suture removal.    Monitor for any signs of redness, drainage, or any other signs of infection.    Be sure to keep area clean and dry of any debris.    Follow-up with orthopedics as soon as appointment can be scheduled.    Return to primary care provider return emergency room if develops any new signs symptoms or any other worrisome symptoms.

## 2022-10-04 NOTE — Clinical Note
"Tomasa Brownmago Lay was seen and treated in our emergency department on 10/4/2022.  She may return to work on 10/05/2022.       If you have any questions or concerns, please don't hesitate to call.      Peter Wilks NP"

## 2022-10-04 NOTE — ED PROVIDER NOTES
Encounter Date: 10/4/2022       History     Chief Complaint   Patient presents with    Laceration     Laceration to third digit left hand.  Patient states it was cut with an electric .     Patient is a 52 year female presents emergency room with laceration to the left middle finger tip.  Patient states she was trimming bushes with a head tremor when he cut her finger.  Bleeding is controlled with direct pressure.  Patient denies all other complaints this time.    Review of patient's allergies indicates:  No Known Allergies  Past Medical History:   Diagnosis Date    Cancer      Past Surgical History:   Procedure Laterality Date    REMOVAL OF IMPLANT Bilateral 5/30/2022    Procedure: REMOVAL, IMPLANT;  Surgeon: Bijan Cotter MD;  Location: [address];  Service: Plastics;  Laterality: Bilateral;    REPLACEMENT OF IMPLANT OF BREAST Bilateral 5/30/2022    Procedure: REPLACEMENT, IMPLANT, BREAST;  Surgeon: Bijan Cotter MD;  Location: [address];  Service: Plastics;  Laterality: Bilateral;     History reviewed. No pertinent family history.  Social History     Tobacco Use    Smoking status: Never    Smokeless tobacco: Never   Substance Use Topics    Alcohol use: Never    Drug use: Never     Review of Systems   Constitutional: Negative.    HENT: Negative.     Eyes: Negative.    Respiratory: Negative.     Cardiovascular: Negative.    Gastrointestinal: Negative.    Endocrine: Negative.    Genitourinary: Negative.    Musculoskeletal: Negative.    Skin:  Positive for wound (Middle finger left hand laceration through the nail and entire finger tip).   Allergic/Immunologic: Negative for food allergies.   Neurological: Negative.    Hematological: Negative.    Psychiatric/Behavioral: Negative.     All other systems reviewed and are negative.    Physical Exam     Initial Vitals [10/04/22 1343]   BP Pulse Resp Temp SpO2   123/86 (!) 113 (!) 26 98.2 °F (36.8 °C) 100 %      MAP       --          Physical Exam    Nursing note and vitals reviewed.  Constitutional: She appears well-developed and well-nourished. She is not diaphoretic. No distress.   HENT:   Head: Normocephalic and atraumatic.   Mouth/Throat: Oropharynx is clear and moist.   Eyes: Conjunctivae and EOM are normal. Pupils are equal, round, and reactive to light. No scleral icterus.   Cardiovascular:  Normal rate, regular rhythm, normal heart sounds and intact distal pulses.           No murmur heard.  Pulmonary/Chest: Breath sounds normal. No respiratory distress. She has no wheezes. She has no rhonchi. She has no rales.   Musculoskeletal:         General: Tenderness (Tip 3rd finger left hand) present. No edema. Normal range of motion.     Neurological: She is alert and oriented to person, place, and time. She has normal strength. GCS score is 15. GCS eye subscore is 4. GCS verbal subscore is 5. GCS motor subscore is 6.   Skin: Skin is warm and dry. Capillary refill takes less than 2 seconds.   Left middle finger laceration from middle nail bed all through 3rd finger tip.   Psychiatric: She has a normal mood and affect.       ED Course   Lac Repair    Date/Time: 10/4/2022 3:12 PM  Performed by: Peter Wilks NP  Authorized by: Peter Wilks NP     Consent:     Consent obtained:  Verbal    Consent given by:  Patient    Risks, benefits, and alternatives were discussed: yes      Risks discussed:  Infection, pain, poor cosmetic result, nerve damage, poor wound healing and vascular damage    Alternatives discussed:  No treatment, delayed treatment and referral  Universal protocol:     Procedure explained and questions answered to patient or proxy's satisfaction: yes      Test results available: yes      Imaging studies available: yes      Immediately prior to procedure, a time out was called: yes      Patient identity confirmed:  Verbally with patient, hospital-assigned identification number and arm band  Anesthesia:     Anesthesia method:   Nerve block    Block needle gauge:  25 G    Block anesthetic:  Bupivacaine 0.5% w/o epi    Block injection procedure:  Anatomic landmarks identified, anatomic landmarks palpated, negative aspiration for blood and introduced needle    Block outcome:  Anesthesia achieved  Laceration details:     Location:  Finger    Finger location:  L long finger    Length (cm):  4    Depth (mm):  1  Pre-procedure details:     Preparation:  Patient was prepped and draped in usual sterile fashion and imaging obtained to evaluate for foreign bodies  Exploration:     Hemostasis achieved with:  Direct pressure    Imaging obtained: x-ray      Wound extent: underlying fracture and vascular damage      Contaminated: no    Treatment:     Area cleansed with:  Povidone-iodine    Amount of cleaning:  Standard    Irrigation solution:  Sterile saline    Irrigation method:  Syringe    Debridement:  None    Undermining:  None    Layers/structures repaired:  Deep subcutaneous  Deep subcutaneous:     Suture size:  4-0    Suture material:  Vicryl    Suture technique:  Simple interrupted    Number of sutures:  1  Skin repair:     Repair method:  Sutures    Suture size:  4-0    Suture material:  Nylon    Suture technique:  Simple interrupted    Number of sutures:  6  Approximation:     Approximation:  Close  Repair type:     Repair type:  Simple  Post-procedure details:     Dressing:  Antibiotic ointment, non-adherent dressing and splint for protection    Procedure completion:  Tolerated  Comments:      1st through center of nail bed to the lateral side of middle finger to hold partial nail laceration down.  Other 6 sutures were from tip of finger nail down the center of 3rd finger to complete closure.  Labs Reviewed - No data to display       Imaging Results               X-Ray Hand 2 View Left (Final result)  Result time 10/04/22 14:27:14      Final result by Chaparro Thomas MD (10/04/22 14:27:14)                   Impression:      Soft tissue  laceration of the distal 3rd digit with underlying mildly displaced oblique fracture of the distal tuft.    This report was flagged in Epic as abnormal.      Electronically signed by: Chaparro Thomas  Date:    10/04/2022  Time:    14:27               Narrative:    EXAMINATION:  XR HAND 2 VIEW LEFT    CLINICAL HISTORY:  Pain, unspecified    TECHNIQUE:  AP and lateral views of the left hand.    COMPARISON:  None    FINDINGS:  Soft tissue swelling and laceration of the distal 3rd digit.  There is a mildly displaced oblique fracture of the underlying distal tuft.  Middle and distal phalanx of the 3rd digit remain intact.    Remaining bones of the hands are intact                                    X-Rays:   Independently Interpreted Readings:   Other Readings:  Left hand x-ray    FINDINGS:  Soft tissue swelling and laceration of the distal 3rd digit.  There is a mildly displaced oblique fracture of the underlying distal tuft.  Middle and distal phalanx of the 3rd digit remain intact.     Remaining bones of the hands are intact     Impression:     Soft tissue laceration of the distal 3rd digit with underlying mildly displaced oblique fracture of the distal tuft.  Medications   BUPivacaine (PF) 0.5% (5 mg/mL) injection 25 mg (25 mg Subcutaneous Given by Provider 10/4/22 1402)   diptheria-tetanus toxoids 2-2 Lf unit/0.5 mL injection 0.5 mL (0.5 mLs Intramuscular Given 10/4/22 1520)     Medical Decision Making:   Initial Assessment:   Patient seen examined emergency room.  Patient does have significant anxiety, bleeding is controlled with direct pressure.  Patient has laceration from middle of middle finger nail bed all the way through tip of middle finger.  Nail bed does not appear to be damaged at this time.  Differential Diagnosis:   Laceration, fracture finger, contaminated finger,  Clinical Tests:   Radiological Study: Ordered and Reviewed  ED Management:  Patient seen examined emergency room.  X-ray reveals the  fracture to middle finger that is mildly displaced.  Discussed case with Dr. Lau, advised to wash wound well, closed with sutures, and follow-up with him in clinic.  Seven interrupted sutures placed for closure of laceration.  Bleeding is controlled.  Area was clean and dressed by nursing staff.  Patient will be prescribed antibiotics as well as pain medications.  And follow-up with orthopedics.                        Clinical Impression:   Final diagnoses:  [R52] Pain  [R52] Pain - pt has a laceration of the left third digit  [S61.313A] Laceration of left middle finger with damage to nail, foreign body presence unspecified, initial encounter (Primary)  [C32.319C] Open fracture of tuft of distal phalanx of finger        ED Disposition Condition    Discharge Stable          ED Prescriptions       Medication Sig Dispense Start Date End Date Auth. Provider    cephALEXin (KEFLEX) 750 MG capsule Take 1 capsule (750 mg total) by mouth every 12 (twelve) hours. for 7 days 14 capsule 10/4/2022 10/11/2022 Peter Wilks NP    ibuprofen (ADVIL,MOTRIN) 800 MG tablet Take 1 tablet (800 mg total) by mouth every 6 (six) hours as needed for Pain. 20 tablet 10/4/2022 -- Peter Wilks NP          Follow-up Information       Follow up With Specialties Details Why Contact Info        Follow-up with orthopedics as soon as appointment is scheduled.             Peter Wilks NP  10/04/22 0526

## 2022-10-07 ENCOUNTER — TELEPHONE (OUTPATIENT)
Dept: ORTHOPEDICS | Facility: CLINIC | Age: 52
End: 2022-10-07
Payer: COMMERCIAL

## 2022-10-07 NOTE — TELEPHONE ENCOUNTER
Returned call. Patient was agreeable to appointment of 10/12/22 in [facility].    ----- Message -----  From: Alphonos Payne  Sent: 10/6/2022   9:53 AM CDT  To: Sid Bianchi Staff  Subject: appointment                                      Patient want to speak with a nurse regarding scheduling ER follow up for removing stiches, please call back at [phone] (home) possible one day next week after 1:00pm for appointment

## 2022-10-12 ENCOUNTER — OFFICE VISIT (OUTPATIENT)
Dept: ORTHOPEDICS | Facility: CLINIC | Age: 52
End: 2022-10-12
Payer: COMMERCIAL

## 2022-10-12 VITALS — WEIGHT: 175.06 LBS | BODY MASS INDEX: 29.17 KG/M2 | HEIGHT: 65 IN | RESPIRATION RATE: 14 BRPM

## 2022-10-12 DIAGNOSIS — S62.633B OPEN DISPLACED FRACTURE OF DISTAL PHALANX OF LEFT MIDDLE FINGER, INITIAL ENCOUNTER: Primary | ICD-10-CM

## 2022-10-12 DIAGNOSIS — M79.645 PAIN OF LEFT MIDDLE FINGER: ICD-10-CM

## 2022-10-12 PROCEDURE — 1159F MED LIST DOCD IN RCRD: CPT | Mod: S$GLB,,, | Performed by: ORTHOPAEDIC SURGERY

## 2022-10-12 PROCEDURE — 99999 PR PBB SHADOW E&M-EST. PATIENT-LVL III: ICD-10-PCS | Mod: PBBFAC,,, | Performed by: ORTHOPAEDIC SURGERY

## 2022-10-12 PROCEDURE — 26750 PR CLOSE RX DIST FINGR FX: ICD-10-PCS | Mod: F7,S$GLB,, | Performed by: ORTHOPAEDIC SURGERY

## 2022-10-12 PROCEDURE — 99999 PR PBB SHADOW E&M-EST. PATIENT-LVL III: CPT | Mod: PBBFAC,,, | Performed by: ORTHOPAEDIC SURGERY

## 2022-10-12 PROCEDURE — 1159F PR MEDICATION LIST DOCUMENTED IN MEDICAL RECORD: ICD-10-PCS | Mod: S$GLB,,, | Performed by: ORTHOPAEDIC SURGERY

## 2022-10-12 PROCEDURE — 3008F BODY MASS INDEX DOCD: CPT | Mod: S$GLB,,, | Performed by: ORTHOPAEDIC SURGERY

## 2022-10-12 PROCEDURE — 26750 TREAT FINGER FRACTURE EACH: CPT | Mod: F7,S$GLB,, | Performed by: ORTHOPAEDIC SURGERY

## 2022-10-12 PROCEDURE — 3008F PR BODY MASS INDEX (BMI) DOCUMENTED: ICD-10-PCS | Mod: S$GLB,,, | Performed by: ORTHOPAEDIC SURGERY

## 2022-10-12 PROCEDURE — 99203 OFFICE O/P NEW LOW 30 MIN: CPT | Mod: 57,S$GLB,, | Performed by: ORTHOPAEDIC SURGERY

## 2022-10-12 PROCEDURE — 99203 PR OFFICE/OUTPT VISIT, NEW, LEVL III, 30-44 MIN: ICD-10-PCS | Mod: 57,S$GLB,, | Performed by: ORTHOPAEDIC SURGERY

## 2022-10-12 NOTE — LETTER
October 12, 2022      [facility]   [address] [place] [address]  Phone number: [phone]  Fax number: [phone]       Patient: Tomasa Lay   YOB: 1970  Date of Visit: 10/12/2022    To Whom It May Concern:    Nayeli Lay  was at [facility] on 10/12/2022. The patient may return to work/school on 10/13/22 with restrictions. Restictions are as follows: One-handed activities with the right hand only no lifting/pushing/pulling/climbing requiring the use of the left hand.  No activities on ladders/scaffolding/stairs.  Must work in a clean dry environment.  If you have any questions or concerns, or if I can be of further assistance, please do not hesitate to contact me.     Sincerely,        Mary Hernandez LPN

## 2022-10-12 NOTE — PROGRESS NOTES
Subjective:      Patient ID: Tomasa Lay is a 52 y.o. female.    Chief Complaint: Injury and Pain of the Left Hand    Referring Provider: Mason Ann Md  [address] [place] [address]    HPI:  Ms. Lay is a 52-year-old right-hand-dominant lady who presented today for evaluation of 8 days of left middle finger tip injury.  Her dated injury 10/04/2022.  The patient she was hedging her bushes and her hedger fell, she grasp the blade causing a laceration to the tip of her left middle finger.  She was seen in the emergency room on her date of injury and the injury was irrigated and then closed loosely with suture.  She has been on oral antibiotics since her injury.  She states she is doing well and her pain is well controlled.  She has some mild numbness at the tip of her left middle finger.    Past Medical History:   Diagnosis Date     *  *  *  * Right breast cancer treated with bilateral mastectomy and chemotherapy  Seasonal allergies  Depression  Anxiety   Headaches      Past Surgical History:   Procedure Laterality Date    BILATERAL MASTECTOMY Bilateral    * BILATERAL BREAST RECONSTRUCTION WITH IMPLANT    REVISION BREAST RECONSTRUCTION WITH IMPLANT Bilateral 2022   *  *  *  * RIGHT BREAST BIOPSY  CHOLECYSTECTOMY   HYSTERECTOMY   LASIK EYE SURGERY       Review of patient's allergies indicates:  No Known Allergies    Social History     Occupational History       Tobacco Use    Smoking status: Never    Smokeless tobacco: Never   Substance and Sexual Activity    Alcohol use: Never    Drug use: Never    Sexual activity: Heterosexual      Family history:   Father: , COPD, cancer.    Mother:  Alive, denied medical problems.    Brother:  1, alive, denied medical problems.    Sister:  1, alive, unsure.    Son:  2, 1 , MVA.    Previous Hospitalizations:  Childbirth.    ROS:   Review of Systems   Constitutional: Negative for diaphoresis and fever.   HENT:   Negative for ear pain, hearing loss, nosebleeds and tinnitus.    Eyes:  Negative for pain, redness and visual disturbance.   Cardiovascular:  Negative for chest pain and palpitations.   Respiratory:  Negative for cough and shortness of breath.    Skin:  Negative for itching and rash.   Musculoskeletal:  Negative for back pain, joint swelling, myalgias and stiffness.   Gastrointestinal:  Negative for constipation, diarrhea, nausea and vomiting.   Genitourinary:  Negative for dysuria, frequency and hematuria.   Neurological:  Negative for dizziness, headaches, numbness, seizures and weakness.   Psychiatric/Behavioral:  The patient is not nervous/anxious.    Allergic/Immunologic: Negative for environmental allergies.         Objective:      Physical Exam:   General: AAOx3.  No acute distress  HEENT: Normocephalic, PEARLA EOMI, Good Dentition  Neck: Supple, No JVD  Chest: Symetric, equal excursion on inspiration  Abdomen: Soft NTND  Vascular:  Pulses intact and equal bilaterally.  Capillary refill less than 3 seconds and equal bilaterally  Neurologic:  Pinprick and soft touch intact and equal bilaterally  Integment:  No ecchymosis or erythema vertical ulna laceration volar to dorsal left middle finger with sutures in place.  Extremity:  Finger: Extension/flexion at MCP PIP and D IP intact with block test equal bilaterally.  Pronation/supination both wrists equal.  Dorsiflexion/volar flexion both hands equal.  Nontender in the anatomic snuffbox bilaterally.  Nontender at the 1st dorsal compartment bilaterally.  Finkelstein's negative bilaterally.  No swelling at the 1st dorsal compartment bilaterally.  Nontender at the scapholunate interval bilaterally.  Tavera's test negative bilaterally.  Nontender at the DRUJ/TFCC bilaterally.  Ulnar impaction test negative bilaterally.  Wartenberg sign negative bilaterally.  Minimal swelling tip left middle finger.  Radiography:  Personally reviewed x-rays of the left hand completed  on 10/04/2022 showed and opened mildly displaced distal phalanx tuft fracture with soft tissue defect      Assessment:       Impression:      1. Open displaced fracture of distal phalanx of left middle finger, initial encounter    2. Pain of left middle finger          Plan:       1.  Discussed physical examination and radiographic findings with the patient. Tomasa understands that she had an open displaced distal tuft fracture of the distal phalanx of the left middle finger.  She appears to be doing well.  Treatment alternatives and outcomes were discussed with the patient she understands she could continue to be treated conservatively with observation, activity modification, NSAIDs, bracing, physical therapy, injections, or she could consider surgical intervention with revision of closure.  She understands she has good alignment of her injury and recommend she continue with further conservative management.  2. Place a fresh sterile bandage over the tip of the left middle finger.  3. Stack splint left middle finger, 1 was fitted to the patient.  4. One-handed activities with the right hand only no lifting/pushing/pulling/climbing requiring the use of the left hand.  No activities on ladders/scaffolding/stairs.  Must work in a clean dry environment.    5. Continue to elevate.    6. Do not place salve on finger.  7. Follow up in approximately 5 days for suture removal.            Answers submitted by the patient for this visit:  Orthopedics Questionnaire (Submitted on 10/11/2022)  unexpected weight change: No  appetite change : No  sleep disturbance: No  IMMUNOCOMPROMISED: No  dysphoric mood: No  sinus pressure : No  food allergies: No  difficulty urinating: No  painful intercourse: No  blood in stool: No   (Submitted on 10/11/2022)  Chief Complaint: Hand injury  Pain Chronicity: new  History of trauma: No  Onset: in the past 7 days  Frequency: rarely  Progression since onset: unchanged  Injury mechanism:  collision/contact  injury location: at home  pain- numeric: 0/10  pain location: left hand  pain quality: dull  Radiating Pain: No  If your pain is radiating, to what part of the body?: left hand  Aggravating factors: touching  inability to bear weight: No  joint locking: No  limited range of motion: No  tingling: No  Treatments tried: nothing  physical therapy: not tried  Improvement on treatment: mild

## 2022-10-13 ENCOUNTER — PATIENT MESSAGE (OUTPATIENT)
Dept: PLASTIC SURGERY | Facility: CLINIC | Age: 52
End: 2022-10-13
Payer: COMMERCIAL

## 2022-10-18 ENCOUNTER — OFFICE VISIT (OUTPATIENT)
Dept: ORTHOPEDICS | Facility: CLINIC | Age: 52
End: 2022-10-18
Payer: COMMERCIAL

## 2022-10-18 VITALS — WEIGHT: 175.06 LBS | RESPIRATION RATE: 19 BRPM | BODY MASS INDEX: 29.17 KG/M2 | HEIGHT: 65 IN

## 2022-10-18 DIAGNOSIS — S62.633D OPEN DISPLACED FRACTURE OF DISTAL PHALANX OF LEFT MIDDLE FINGER WITH ROUTINE HEALING, SUBSEQUENT ENCOUNTER: Primary | ICD-10-CM

## 2022-10-18 DIAGNOSIS — Z48.02 VISIT FOR SUTURE REMOVAL: ICD-10-CM

## 2022-10-18 PROCEDURE — 99024 PR POST-OP FOLLOW-UP VISIT: ICD-10-PCS | Mod: S$GLB,,, | Performed by: ORTHOPAEDIC SURGERY

## 2022-10-18 PROCEDURE — 99999 PR PBB SHADOW E&M-EST. PATIENT-LVL III: CPT | Mod: PBBFAC,,, | Performed by: ORTHOPAEDIC SURGERY

## 2022-10-18 PROCEDURE — 1159F PR MEDICATION LIST DOCUMENTED IN MEDICAL RECORD: ICD-10-PCS | Mod: S$GLB,,, | Performed by: ORTHOPAEDIC SURGERY

## 2022-10-18 PROCEDURE — 99024 POSTOP FOLLOW-UP VISIT: CPT | Mod: S$GLB,,, | Performed by: ORTHOPAEDIC SURGERY

## 2022-10-18 PROCEDURE — 1159F MED LIST DOCD IN RCRD: CPT | Mod: S$GLB,,, | Performed by: ORTHOPAEDIC SURGERY

## 2022-10-18 PROCEDURE — 99999 PR PBB SHADOW E&M-EST. PATIENT-LVL III: ICD-10-PCS | Mod: PBBFAC,,, | Performed by: ORTHOPAEDIC SURGERY

## 2022-10-18 NOTE — LETTER
October 18, 2022    Tomasa Lay  [address]  [place] [address]         [facility]  [address]  [place] [address]  Phone: [phone]  Fax: [phone] October 18, 2022     Patient: Tomasa Lay   YOB: 1970   Date of Visit: 10/18/2022       To Whom It May Concern:     Tomasa Lay may return to work on 10/19/2022. Must be able to keep left hand clean and dry. May wear a glove to do so.    If you have any questions or concerns, please don't hesitate to call.    Sincerely,        Libby Ann MA

## 2022-10-18 NOTE — PROGRESS NOTES
Subjective:      Patient ID: Tomasa Lay is a 52 y.o. female.    Chief Complaint: Follow-up of the Left Hand      HPI:  Ms. Lay returned today for re-evaluation of her left middle finger.  She was last seen on 10/12/2022 for a left middle finger injury which happened on 10/04/2022 after she was hedging her bushes and her hedger fell, she grasp the blade causing a laceration to the tip of her left middle finger.    ROS:  No new diagnosis/surgery/prescriptions since last office visit on 10/12/2022.  Constitutional: Negative for diaphoresis and fever.   HENT:  Negative for ear pain, hearing loss, nosebleeds and tinnitus.    Eyes:  Negative for pain, redness and visual disturbance.   Cardiovascular:  Negative for chest pain and palpitations.   Respiratory:  Negative for cough and shortness of breath.    Skin:  Negative for itching and rash.   Musculoskeletal:  Negative for back pain, joint swelling, myalgias and stiffness.   Gastrointestinal:  Negative for constipation, diarrhea, nausea and vomiting.   Genitourinary:  Negative for dysuria, frequency and hematuria.   Neurological:  Negative for dizziness, headaches, numbness, seizures and weakness.   Psychiatric/Behavioral:  The patient is not nervous/anxious.    Allergic/Immunologic: Negative for environmental allergies      Objective:      Physical Exam:   General: AAOx3.  No acute distress  Vascular:  Pulses intact and equal bilaterally.  Capillary refill less than 3 seconds and equal bilaterally  Neurologic:  Pinprick and soft touch intact and equal bilaterally  Integment:  Left middle finger tip laceration well approximated with sutures in place.  Extremity: Finger: Extension/flexion at MCP PIP and D IP intact with block test equal bilaterally.  Pronation/supination both wrists equal.  Dorsiflexion/volar flexion both hands equal.  Nontender in the anatomic snuffbox bilaterally.  Nontender at the 1st dorsal compartment bilaterally.  Finkelstein's negative  bilaterally.  No swelling at the 1st dorsal compartment bilaterally.  Nontender at the scapholunate interval bilaterally.  Tavera's test negative bilaterally.  Nontender at the DRUJ/TFCC bilaterally.  Ulnar impaction test negative bilaterally.  Wartenberg sign negative bilaterally.  Minimal swelling tip left middle finger.  Radiography:  No new x-rays done today.        Assessment:       Impression:     1. Open displaced fracture of distal phalanx of left middle finger with routine healing, subsequent encounter              Plan:       1.  Discussed physical examination with the patient. Tomasa understands that she appears to be doing well.  2. Remove sutures and place Steri-Strips.  3. Continue to use stack splint on left middle finger.    4. Any pain can be treated with over-the-counter medications dosed per box instructions.  5. May return to work and may use left middle, but must keep it clean and dry.    6. May return to work with restrictions must be allowed to keep left small finger clean and dry   7. Follow up in 1 month with an x-ray of the left hand.

## 2022-10-24 ENCOUNTER — TELEPHONE (OUTPATIENT)
Dept: ORTHOPEDICS | Facility: CLINIC | Age: 52
End: 2022-10-24
Payer: COMMERCIAL

## 2022-10-24 NOTE — TELEPHONE ENCOUNTER
Returned call. Patient stated she needs a letter for work staying she can work without a glove. I stated that according to the last office note, the patient needs to work in a clean and dry environment. I stated I would speak with Dr. Lau and see when she would be able to work with out a glove to protect her incision. Patient stated understanding.     ----- Message from Robert Heard sent at 10/24/2022  1:35 PM CDT -----  Who Called:pt       What is the reqeust in detail: pt is request a call back, about prior procedures .please advise       Can the clinic reply by [facility]?no       Best Call Back Number:[phone]      Additional Information:

## 2023-11-12 ENCOUNTER — OFFICE VISIT (OUTPATIENT)
Dept: URGENT CARE | Facility: CLINIC | Age: 53
End: 2023-11-12
Payer: COMMERCIAL

## 2023-11-12 VITALS
DIASTOLIC BLOOD PRESSURE: 70 MMHG | OXYGEN SATURATION: 98 % | HEIGHT: 65 IN | RESPIRATION RATE: 18 BRPM | TEMPERATURE: 98 F | SYSTOLIC BLOOD PRESSURE: 161 MMHG | WEIGHT: 169 LBS | HEART RATE: 65 BPM | BODY MASS INDEX: 28.16 KG/M2

## 2023-11-12 DIAGNOSIS — H10.9 CONJUNCTIVITIS OF BOTH EYES, UNSPECIFIED CONJUNCTIVITIS TYPE: Primary | ICD-10-CM

## 2023-11-12 PROCEDURE — 99203 OFFICE O/P NEW LOW 30 MIN: CPT | Mod: S$GLB,,, | Performed by: NURSE PRACTITIONER

## 2023-11-12 PROCEDURE — 99203 PR OFFICE/OUTPT VISIT, NEW, LEVL III, 30-44 MIN: ICD-10-PCS | Mod: S$GLB,,, | Performed by: NURSE PRACTITIONER

## 2023-11-12 RX ORDER — ESCITALOPRAM OXALATE 5 MG/1
5 TABLET ORAL
COMMUNITY
Start: 2023-10-23

## 2023-11-12 RX ORDER — ERYTHROMYCIN 5 MG/G
OINTMENT OPHTHALMIC 3 TIMES DAILY
Qty: 3.5 G | Refills: 0 | Status: SHIPPED | OUTPATIENT
Start: 2023-11-12

## 2023-11-12 RX ORDER — GENTAMICIN SULFATE 3 MG/ML
2 SOLUTION/ DROPS OPHTHALMIC EVERY 4 HOURS
Qty: 10 ML | Refills: 0 | Status: SHIPPED | OUTPATIENT
Start: 2023-11-12

## 2023-11-12 RX ORDER — KETOROLAC TROMETHAMINE 5 MG/ML
1 SOLUTION OPHTHALMIC 4 TIMES DAILY
Qty: 10 ML | Refills: 0 | Status: SHIPPED | OUTPATIENT
Start: 2023-11-12 | End: 2023-11-22

## 2023-11-12 NOTE — PROGRESS NOTES
"Subjective:       Patient ID: Tomasa Lay is a 53 y.o. female.    Vitals:  height is 5' 5" (1.651 m) and weight is 76.7 kg (169 lb). Her oral temperature is 98.3 °F (36.8 °C). Her blood pressure is 161/70 (abnormal) and her pulse is 65. Her respiration is 18 and oxygen saturation is 98%.     Chief Complaint: red eyes (Started x 2 days ago in the right eye, now it's mostly in the left.  Using allergy eye drops with no relief.)    This is a 53 y.o. female who presents today with a chief complaint of Started x 2 days ago in the right eye, now it's mostly in the left.  Using allergy eye drops with no relief.  Patient presents with:  red eyes: Started x 2 days ago in the right eye, now it's mostly in the left.  Using allergy eye drops with no relief.         ROS        Objective:      Physical Exam   Constitutional: She is oriented to person, place, and time. She appears well-developed.   HENT:   Head: Normocephalic and atraumatic.   Ears:   Right Ear: External ear normal.   Left Ear: External ear normal.   Nose: Nose normal.   Mouth/Throat: Mucous membranes are normal.   Eyes: Lids are normal. Right conjunctiva is injected. Left conjunctiva is injected.   Neck: Trachea normal. Neck supple.   Cardiovascular: Normal rate, regular rhythm and normal heart sounds.   Pulmonary/Chest: Effort normal and breath sounds normal. No respiratory distress.   Abdominal: Normal appearance and bowel sounds are normal. She exhibits no distension and no mass. Soft. There is no abdominal tenderness.   Musculoskeletal: Normal range of motion.         General: Normal range of motion.   Neurological: She is alert and oriented to person, place, and time. She has normal strength.   Skin: Skin is warm, dry, intact, not diaphoretic and not pale.   Psychiatric: Her speech is normal and behavior is normal. Judgment and thought content normal.   Nursing note and vitals reviewed.        Past medical history and current medications reviewed. "       Assessment:           1. Conjunctivitis of both eyes, unspecified conjunctivitis type              Plan:         Conjunctivitis of both eyes, unspecified conjunctivitis type  -     gentamicin (GARAMYCIN) 0.3 % ophthalmic solution; Place 2 drops into both eyes every 4 (four) hours.  Dispense: 10 mL; Refill: 0  -     ketorolac 0.5% (ACULAR) 0.5 % Drop; Place 1 drop into both eyes 4 (four) times daily. for 10 days  Dispense: 10 mL; Refill: 0             Patient Instructions     You must understand that you've received an Urgent Care treatment only and that you may be released before all your medical problems are known or treated. You, the patient, will arrange for follow up care as instructed.  Follow up with your PCP or specialty clinic as directed in the next 1-2 weeks if not improved or as needed.  You can call [phone] to schedule an appointment with the appropriate provider.  If your condition worsens we recommend that you receive another evaluation at the emergency room immediately or contact your primary medical clinics after hours call service to discuss your concerns.  Please return here or go to the Emergency Department for any concerns or worsening of condition.  Please if you smoke please consider quitting. [facility] Smoke cessation hotline number is [phone], available at this number is free counseling and medications to live a healthier life!       If you were prescribed a narcotic or controlled medication, do not drive or operate heavy equipment or machinery while taking these medications.    If you were not prescribed an antibiotic and your not better please return for a recheck. Antibiotic therapy is not always indicated initially.   Please attempt over the counter medications, give it time and try Echinacea, Zinc and Vitamin C to fight common colds and virus.

## 2023-11-12 NOTE — PATIENT INSTRUCTIONS
You must understand that you've received an Urgent Care treatment only and that you may be released before all your medical problems are known or treated. You, the patient, will arrange for follow up care as instructed.  Follow up with your PCP or specialty clinic as directed in the next 1-2 weeks if not improved or as needed.  You can call [phone] to schedule an appointment with the appropriate provider.  If your condition worsens we recommend that you receive another evaluation at the emergency room immediately or contact your primary medical clinics after hours call service to discuss your concerns.  Please return here or go to the Emergency Department for any concerns or worsening of condition.  Please if you smoke please consider quitting. [facility] Smoke cessation hotline number is [phone], available at this number is free counseling and medications to live a healthier life!       If you were prescribed a narcotic or controlled medication, do not drive or operate heavy equipment or machinery while taking these medications.    If you were not prescribed an antibiotic and your not better please return for a recheck. Antibiotic therapy is not always indicated initially.   Please attempt over the counter medications, give it time and try Echinacea, Zinc and Vitamin C to fight common colds and virus.

## 2024-10-03 NOTE — H&P
[facility] ([facility])  History & Physical  Plastic Surgery    SUBJECTIVE:         History of Present Illness:  Patient is a 52 y.o. female presents to Plastic surgery Clinic after undergoing a bilateral mastectomy and reconstruction using tissue expanders followed by implants in 2018. Patient now presents to the Plastic surgery Clinic with a deflated left breast implant.  The  and patient basically stated happen very rapidly.  Physical examination reveals very good was reconstruction on the right side.  She could use a little bit of liposuction in the axillary region.  On the left side the implant was totally deflated.  We will go ahead and bring the patient to surgery for an implant exchange.  The patient does want to be more perky and I had a long discussion with the patient the only way to do this we make her implants larger. She has agreed to this but not much larger.    No medications prior to admission.       Review of patient's allergies indicates:  No Known Allergies    No past medical history on file.  No past surgical history on file.  No family history on file.  Social History     Tobacco Use    Smoking status: Never Smoker            OBJECTIVE:     Vital Signs (Most Recent):       ASSESSMENT/PLAN:     52 F s/p bilateral mastectomy with TE reconstruction now with deflation of left expander.  - Patient explained all risks and benefits of procedure     no

## 2025-05-16 ENCOUNTER — OFFICE VISIT (OUTPATIENT)
Dept: URGENT CARE | Facility: CLINIC | Age: 55
End: 2025-05-16
Payer: OTHER MISCELLANEOUS

## 2025-05-16 ENCOUNTER — OCCUPATIONAL HEALTH (OUTPATIENT)
Dept: URGENT CARE | Facility: CLINIC | Age: 55
End: 2025-05-16

## 2025-05-16 VITALS
OXYGEN SATURATION: 98 % | BODY MASS INDEX: 24.38 KG/M2 | WEIGHT: 180 LBS | SYSTOLIC BLOOD PRESSURE: 146 MMHG | HEIGHT: 72 IN | RESPIRATION RATE: 20 BRPM | TEMPERATURE: 99 F | HEART RATE: 86 BPM | DIASTOLIC BLOOD PRESSURE: 90 MMHG

## 2025-05-16 DIAGNOSIS — Z02.83 ENCOUNTER FOR DRUG SCREENING: Primary | ICD-10-CM

## 2025-05-16 DIAGNOSIS — S40.012A CONTUSION OF LEFT SHOULDER, INITIAL ENCOUNTER: ICD-10-CM

## 2025-05-16 DIAGNOSIS — Z02.6 ENCOUNTER RELATED TO WORKER'S COMPENSATION CLAIM: Primary | ICD-10-CM

## 2025-05-16 NOTE — LETTER
[facility] [place] [address], [address]  [place] [address]  Phone: [phone]  Fax: [phone]  [facility]: [email or web address]    Pt Name: Tomasa Lay  Injury Date:     Employee ID: [id number] Date of First Treatment: 05/16/2025   Company: [employer]      Appointment Time: 11:45 AM Arrived: 12:30 PM   Provider: Jennifer Shaver NP Time Out: 2:00 PM     Office Treatment:   1. Encounter related to worker's compensation claim    2. Contusion of left shoulder, initial encounter       Patient Instructions: None; Discussed home care.         Restrictions: None.     Return Appointment: N/A.      NIKO WALKERRT(R)

## 2025-05-16 NOTE — PATIENT INSTRUCTIONS
You must understand that you've received an Urgent Care treatment only and that you may be released before all your medical problems are known or treated. You, the patient, will arrange for follow up care as instructed.  Follow up with your PCP or specialty clinic as directed in the next 1-2 weeks if not improved or as needed.  You can call [phone] to schedule an appointment with the appropriate provider.  If your condition worsens we recommend that you receive another evaluation at the emergency room immediately or contact your primary medical clinics after hours call service to discuss your concerns.  Please return here or go to the Emergency Department for any concerns or worsening of condition.  Please if you smoke please consider quitting. [facility] cessation hotline number is [phone], available at this number is free counseling and medications to live a healthier life!         If you were prescribed a narcotic or controlled medication, do not drive or operate heavy equipment or machinery while taking these medications.

## 2025-05-16 NOTE — PROGRESS NOTES
"Subjective:      Patient ID: Tomasa Lay is a 55 y.o. female.    Vitals:  height is 8' 5" (2.565 m) (abnormal) and weight is 81.6 kg (180 lb). Her oral temperature is 98.5 °F (36.9 °C). Her blood pressure is 146/90 (abnormal) and her pulse is 86. Her respiration is 20 and oxygen saturation is 98%.     Chief Complaint: Shoulder Pain    Patient's place of employment - [employer]   Patient's job title -   Date of injury - 05/16/2025  Body part injured including left or right - left shoulder  Injury Mechanism - opening the storage door and a pan hit left shoulder.  What they were doing when they got hurt - opening the storage door  What they did immediately after - spoke to supervisor   Pain scale right now - 4       Pt states that this morning she was reaching to shut a door when a medium tray hit her left shoulder. Pt states that she is having some soreness to the area. Pt states that she has not taken anything at this time. Pt states that it is only a little sore. Pt has not bruising, erythema noted to area during evaluation.       Constitution: Negative.   HENT: Negative.     Neck: neck negative.   Cardiovascular: Negative.    Eyes: Negative.    Respiratory: Negative.     Gastrointestinal: Negative.    Endocrine: negative.   Genitourinary: Negative.    Musculoskeletal:  Positive for trauma and joint pain. Negative for joint swelling and abnormal ROM of joint.   Skin: Negative.  Negative for erythema and bruising.   Allergic/Immunologic: Negative.    Neurological: Negative.    Hematologic/Lymphatic: Negative.    Psychiatric/Behavioral: Negative.        Objective:     Physical Exam   Constitutional: She is oriented to person, place, and time.   HENT:   Head: Normocephalic and atraumatic.   Ears:   Right Ear: External ear normal.   Left Ear: External ear normal.   Nose: Nose normal.   Eyes: Conjunctivae are normal. Pupils are equal, round, and reactive to light. Extraocular " movement intact   Neck: Neck supple.   Cardiovascular: Normal rate and normal pulses.   Pulmonary/Chest: Effort normal.   Abdominal: Normal appearance.   Musculoskeletal: Normal range of motion.         General: Tenderness and signs of injury present. No swelling. Normal range of motion.   Neurological: no focal deficit. She is alert, oriented to person, place, and time and at baseline.   Skin: Skin is warm. No erythema   Psychiatric: Her behavior is normal. Mood, judgment and thought content normal.   Nursing note and vitals reviewed.      Assessment:     1. Encounter related to worker's compensation claim    2. Contusion of left shoulder, initial encounter        Plan:       Encounter related to worker's compensation claim    Contusion of left shoulder, initial encounter           Discussed at home care with patient.

## (undated) DEVICE — DRESSING XEROFORM FOIL PK 1X8

## (undated) DEVICE — SOL NS 1000CC

## (undated) DEVICE — SKINMARKER & RULER REGULAR X-F

## (undated) DEVICE — BOVIE SUCTION

## (undated) DEVICE — TRAY MINOR GEN SURG

## (undated) DEVICE — SPONGE LAP 18X18 PREWASHED

## (undated) DEVICE — PACK UNIVERSAL SPLIT II

## (undated) DEVICE — SUT MCRYL PLUS 4-0 PS2 27IN

## (undated) DEVICE — PAD ABD 8X10 STERILE

## (undated) DEVICE — ELECTRODE REM PLYHSV RETURN 9

## (undated) DEVICE — TOWEL OR DISP STRL BLUE 4/PK

## (undated) DEVICE — BOWL STERILE LARGE 32OZ

## (undated) DEVICE — SUT MONOCRYL 3-0 PS-2 UND

## (undated) DEVICE — REMOVER STAPLE SKIN STERILE

## (undated) DEVICE — BLADE SURG CARBON STEEL #10

## (undated) DEVICE — SEE MEDLINE ITEM 157128

## (undated) DEVICE — SUT SILK 2-0 PS 18IN BLACK

## (undated) DEVICE — NDL HYPO REG 25G X 1 1/2

## (undated) DEVICE — SYR 50CC LL

## (undated) DEVICE — SPONGE DERMACEA GAUZE 4X4

## (undated) DEVICE — BRA STYLE 7 SIZE 42

## (undated) DEVICE — TRAY FOLEY 16FR INFECTION CONT

## (undated) DEVICE — TIP YANKAUERS BULB NO VENT

## (undated) DEVICE — SYS PRINEO SKIN CLOSURE

## (undated) DEVICE — BLADE SURG #15 CARBON STEEL

## (undated) DEVICE — GOWN AERO CHROME W/ TOWEL XL

## (undated) DEVICE — MANIFOLD 4 PORT

## (undated) DEVICE — GAUZE SPONGE 4X4 12PLY

## (undated) DEVICE — EVACUATOR WOUND BULB 100CC

## (undated) DEVICE — STAPLER SKIN ROTATING HEAD

## (undated) DEVICE — GOWN SURGICAL X-LARGE

## (undated) DEVICE — SPONGE GAUZE 16PLY 4X4

## (undated) DEVICE — SUT 2-0 VICRYL / CT-1